# Patient Record
Sex: FEMALE | Race: WHITE | NOT HISPANIC OR LATINO | Employment: OTHER | URBAN - METROPOLITAN AREA
[De-identification: names, ages, dates, MRNs, and addresses within clinical notes are randomized per-mention and may not be internally consistent; named-entity substitution may affect disease eponyms.]

---

## 2023-04-13 ENCOUNTER — OFFICE VISIT (OUTPATIENT)
Dept: CARDIOLOGY | Facility: CLINIC | Age: 88
End: 2023-04-13
Payer: MEDICARE

## 2023-04-13 VITALS
DIASTOLIC BLOOD PRESSURE: 66 MMHG | HEART RATE: 98 BPM | BODY MASS INDEX: 33.49 KG/M2 | WEIGHT: 189 LBS | SYSTOLIC BLOOD PRESSURE: 124 MMHG | HEIGHT: 63 IN

## 2023-04-13 DIAGNOSIS — I48.0 PAROXYSMAL ATRIAL FIBRILLATION: Primary | ICD-10-CM

## 2023-04-13 PROCEDURE — 93000 ELECTROCARDIOGRAM COMPLETE: CPT | Performed by: INTERNAL MEDICINE

## 2023-04-13 PROCEDURE — 99204 OFFICE O/P NEW MOD 45 MIN: CPT | Performed by: INTERNAL MEDICINE

## 2023-04-13 RX ORDER — AMIODARONE HYDROCHLORIDE 200 MG/1
200 TABLET ORAL DAILY
Qty: 30 TABLET | Refills: 11 | COMMUNITY
Start: 2022-11-15 | End: 2023-11-15

## 2023-04-13 RX ORDER — CYANOCOBALAMIN (VITAMIN B-12) 500 MCG
1 TABLET ORAL DAILY
COMMUNITY

## 2023-04-13 RX ORDER — SACCHAROMYCES BOULARDII 250 MG
250 CAPSULE ORAL DAILY
COMMUNITY

## 2023-04-13 RX ORDER — CARVEDILOL 12.5 MG/1
12.5 TABLET ORAL 2 TIMES DAILY
COMMUNITY

## 2023-04-13 RX ORDER — GLUCOSAMINE/D3/BOSWELLIA SERRA 1500MG-400
10000 TABLET ORAL DAILY
COMMUNITY

## 2023-04-13 RX ORDER — APIXABAN 5 MG/1
5 TABLET, FILM COATED ORAL 2 TIMES DAILY
COMMUNITY
Start: 2023-01-25

## 2023-04-13 NOTE — PROGRESS NOTES
Date of Office Visit: 2023  Encounter Provider: Amrit Tabares MD  Place of Service: [facility] CARDIOLOGY  Patient Name: Faiza Holloway  :1935    Chief Complaint   Patient presents with   • paroxysmal AFIB     New Patient Consult   • Hypertension          • Sleep Apnea   • hx of Pulmonary embolism   :     HPI: Faiza Holloway is a 87 y.o. female who presents today for paroxysmal atrial fibrillation.         She several year history of paroxysmal atrial fibrillation, and has been treated with amiodarone since .  She has been under the treatment of the [facility].     Even on amiodarone, she has continue to have occasional to frequent episodes of atrial fibrillation.  She keeps meticulous records of her heart rate and blood pressure.  I looked at this an she appears to be having a few episodes of afib a month, based on the recorded heart rate.      She describes symptoms of headache, feeling off, and tachycardia which she attributed to the afib.      Interestingly, she was in afib at the time of the visit, but I could not tell that she was aware of it.  She described herself feeing good today.      She saw Dr. Fitzpatrick in September and was some additional options were offered for her afib, but she didn't understand them and they didn't seem to get along well.     She lives alone independently, and still drives.      The says the most significant impact on her quality of life is her knee pain.      She has not been recently hospitalized for her atrial fibrillation.     She had lung cancer in 2018.                Past Medical History:   Diagnosis Date   • Anemia    • Benign essential hypertension    • Cancer     SKIN & LUNG   • Carotid artery stenosis    • Diastolic dysfunction    • GERD (gastroesophageal reflux disease)    • Herpes zoster    • Osteoporosis    • Paroxysmal atrial fibrillation    • Pulmonary embolism    • Rectal bleeding    • Rhinitis    •  Sleep apnea    • Tricuspid valve regurgitation        No past surgical history on file.    Social History     Socioeconomic History   • Marital status:        No family history on file.    Review of Systems   Constitutional: Negative.   Cardiovascular: Negative.    Respiratory: Negative.    Gastrointestinal: Negative.        Allergies   Allergen Reactions   • Rivaroxaban Other (See Comments)     bleed  bleed     • Pseudoephedrine Other (See Comments)   • Raloxifene Other (See Comments)     pulm emboli & DVT  pulm emboli & DVT           Current Outpatient Medications:   •  amiodarone (PACERONE) 200 MG tablet, Take 1 tablet by mouth Daily., Disp: 30 tablet, Rfl: 11  •  Ascorbic Acid 500 MG chewable tablet, Chew 500 mg Daily., Disp: , Rfl:   •  Biotin 84885 MCG tablet, Take 1 tablet by mouth Daily., Disp: , Rfl:   •  Boswellia-Glucosamine-Vit D (OSTEO BI-FLEX ONE PER DAY PO), Take  by mouth Daily., Disp: , Rfl:   •  carvedilol (COREG) 12.5 MG tablet, Take 1 tablet by mouth 2 (Two) Times a Day., Disp: , Rfl:   •  Cetirizine HCl 10 MG capsule, Take 1 capsule by mouth Daily., Disp: , Rfl:   •  Cholecalciferol 50 MCG (2000 UT) capsule, Take 125 mcg by mouth Daily., Disp: , Rfl:   •  CINNAMON PO, Take 1,000 mg by mouth Daily., Disp: , Rfl:   •  Coenzyme Q10 10 MG capsule, Take 100 mg by mouth Daily., Disp: , Rfl:   •  CRANBERRY PO, Take 4,200 mg by mouth Daily., Disp: , Rfl:   •  Eliquis 5 MG tablet tablet, Take 1 tablet by mouth 2 (Two) Times a Day., Disp: , Rfl:   •  Folic Acid 0.8 MG capsule, Take 1 capsule by mouth Daily., Disp: , Rfl:   •  KRILL OIL PO, Take 350 mg by mouth Daily., Disp: , Rfl:   •  Multiple Vitamins-Minerals (EYE VITAMINS PO), Take  by mouth., Disp: , Rfl:   •  saccharomyces boulardii (FLORASTOR) 250 MG capsule, Take 1 capsule by mouth Daily., Disp: , Rfl:   •  Unable to find, 1 each 3 (Three) Times a Day. Med Name: BALANCE OF NATURE, Disp: , Rfl:       Objective:     Vitals:    04/13/23 1001  "  BP: 124/66   Pulse: 98   Weight: 85.7 kg (189 lb)   Height: 160 cm (63\")     Body mass index is 33.48 kg/m².    PHYSICAL EXAM:    Vitals and nursing note reviewed.   Constitutional:       General: Not in acute distress.  Pulmonary:      Effort: Pulmonary effort is normal. No respiratory distress.   Cardiovascular:      Normal rate. Regular rhythm.   Edema:     Peripheral edema absent.   Skin:     General: Skin is warm and dry.   Neurological:      Mental Status: Alert and oriented to person, place, and time.   Psychiatric:         Behavior: Behavior normal.         Thought Content: Thought content normal.         Judgment: Judgment normal.             ECG 12 Lead    Date/Time: 4/13/2023 11:26 AM  Performed by: Amrit Tabares MD  Authorized by: Amrit Tabares MD   Comparison: not compared with previous ECG   Rhythm: atrial fibrillation              Assessment:       Diagnosis Plan   1. Paroxysmal atrial fibrillation               Plan:       She clearly has a little bit of atrial fibrillation.      What is less clear is how much true impact the afib is having on her quality of life, and how much she is willing to undergo to manage it differently.     She did not seem to be aware of the atrial fibrillation at all today.     She asked frequently \"what we had to do\" or what was essential for her to live longer.    I think the best option at this time is to continue the current therapy.     We discussed potential \"pace and ablate\" strategy.  PVI was discussed previously,  I would be reluctant, but could consider.     After discussion, she is going to continue current therapy and will follow in 6 months to reassess.     As always, it has been a pleasure to participate in your patient's care.      Sincerely,         Amrit Tabares MD  "